# Patient Record
Sex: FEMALE
[De-identification: names, ages, dates, MRNs, and addresses within clinical notes are randomized per-mention and may not be internally consistent; named-entity substitution may affect disease eponyms.]

---

## 2018-03-14 ENCOUNTER — HOSPITAL ENCOUNTER (INPATIENT)
Dept: HOSPITAL 5 - LD | Age: 33
LOS: 3 days | Discharge: HOME | End: 2018-03-17
Attending: OBSTETRICS & GYNECOLOGY | Admitting: OBSTETRICS & GYNECOLOGY
Payer: MEDICAID

## 2018-03-14 DIAGNOSIS — O28.5: ICD-10-CM

## 2018-03-14 DIAGNOSIS — Z3A.39: ICD-10-CM

## 2018-03-14 DIAGNOSIS — F17.210: ICD-10-CM

## 2018-03-14 LAB
HCT VFR BLD CALC: 36.5 % (ref 30.3–42.9)
HGB BLD-MCNC: 12.5 GM/DL (ref 10.1–14.3)
MCH RBC QN AUTO: 31 PG (ref 28–32)
MCHC RBC AUTO-ENTMCNC: 34 % (ref 30–34)
MCV RBC AUTO: 91 FL (ref 79–97)
PLATELET # BLD: 235 K/MM3 (ref 140–440)
RBC # BLD AUTO: 4.02 M/MM3 (ref 3.65–5.03)

## 2018-03-14 PROCEDURE — 83615 LACTATE (LD) (LDH) ENZYME: CPT

## 2018-03-14 PROCEDURE — 86592 SYPHILIS TEST NON-TREP QUAL: CPT

## 2018-03-14 PROCEDURE — 86901 BLOOD TYPING SEROLOGIC RH(D): CPT

## 2018-03-14 PROCEDURE — 86850 RBC ANTIBODY SCREEN: CPT

## 2018-03-14 PROCEDURE — 82962 GLUCOSE BLOOD TEST: CPT

## 2018-03-14 PROCEDURE — 36415 COLL VENOUS BLD VENIPUNCTURE: CPT

## 2018-03-14 PROCEDURE — 84460 ALANINE AMINO (ALT) (SGPT): CPT

## 2018-03-14 PROCEDURE — 82565 ASSAY OF CREATININE: CPT

## 2018-03-14 PROCEDURE — 76816 OB US FOLLOW-UP PER FETUS: CPT

## 2018-03-14 PROCEDURE — 71046 X-RAY EXAM CHEST 2 VIEWS: CPT

## 2018-03-14 PROCEDURE — 86900 BLOOD TYPING SEROLOGIC ABO: CPT

## 2018-03-14 PROCEDURE — 99211 OFF/OP EST MAY X REQ PHY/QHP: CPT

## 2018-03-14 PROCEDURE — 81001 URINALYSIS AUTO W/SCOPE: CPT

## 2018-03-14 PROCEDURE — 85027 COMPLETE CBC AUTOMATED: CPT

## 2018-03-14 PROCEDURE — G0463 HOSPITAL OUTPT CLINIC VISIT: HCPCS

## 2018-03-14 PROCEDURE — 84450 TRANSFERASE (AST) (SGOT): CPT

## 2018-03-14 PROCEDURE — 84550 ASSAY OF BLOOD/URIC ACID: CPT
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
## 2018-03-15 PROCEDURE — 3E0P7VZ INTRODUCTION OF HORMONE INTO FEMALE REPRODUCTIVE, VIA NATURAL OR ARTIFICIAL OPENING: ICD-10-PCS | Performed by: OBSTETRICS & GYNECOLOGY

## 2018-03-15 RX ADMIN — SODIUM CHLORIDE, SODIUM ACETATE ANHYDROUS, SODIUM GLUCONATE, POTASSIUM CHLORIDE, AND MAGNESIUM CHLORIDE SCH MLS/HR: 526; 222; 502; 37; 30 INJECTION, SOLUTION INTRAVENOUS at 16:10

## 2018-03-15 RX ADMIN — SODIUM CHLORIDE, SODIUM ACETATE ANHYDROUS, SODIUM GLUCONATE, POTASSIUM CHLORIDE, AND MAGNESIUM CHLORIDE SCH MLS/HR: 526; 222; 502; 37; 30 INJECTION, SOLUTION INTRAVENOUS at 17:55

## 2018-03-15 NOTE — OPERATIVE REPORT
Operative Report


Operative Report: 


Date of procedure: 03/15/2018





Pre-operative diagnosis: Breech Presentation





Post-operative diagnosis: Same





Procedure name(s): Primary low transverse  section





Surgeon: Rudy Mcleod MD





Assistant: Anne Davila certified nurse midwife





Anesthesia: Spinal





EBL: 800





Complications: None





Findings: Normal uterus tubes and ovaries, male infant breech presentation, 

weight 7 lbs. 1 oz., Apgars 8 at 1 minute 9 at 5 minutes





Specimen(s): None





Procedure: The patient was brought to the operating room.  A spinal was placed 

without any complications.  She was then placed in left lateral tilt.  Prepped 

and draped in the usual sterile manner.  After testing for adequate anesthesia 

level, a Pfannenstiel incision was made.  This incision was taken down to the 

fascia.  The fascia was then nicked in the midline.  This incision was extended 

out laterally with Ibanez scissors.  The fascia was then  sharply and 

bluntly from the underlying rectus muscles.  The rectus muscles were bluntly 

and sharply .  The peritoneum was then entered with the 's 

fingers.  This incision was spread vertically with care not to damage the 

bladder below.  The bladder flap was then formed sharply and bluntly with 

Metzenbaum scissors.  A transverse incision was made in lower uterine segment.  

This incision was extended laterally with the operators fingers.  The amniotic 

sac was then entered bluntly with the 's fingers.  The infant was 

delivered by delivered in the breech first flexing and extending the lower 

extremities followed by raising the breech then sweeping flexing and extending 

the upper extremities and after coming head was then delivered safely.  The 

infant was bulb suctioned on the mother's abdomen.  Cord was double clamped and 

cut.  The infant was then passed to the nursery personnel who were in 

attendance.  The above Apgar scores were given by the nursery personnel.  The 

placenta was then bluntly removed.  The uterus was then externalized and wiped 

clean the remaining products.  The uterine incision was closed in layers.  The 

first incision was closed in a locking manner using 0 Vicryl.  This was 

followed by imbricating stitch also with 0 Vicryl.  This closure was 

hemostatic.  The bladder flap was copiously irrigated and found to be 

hemostatic. The pelvis was copiously irrigated and found to be hemostatic.  The 

uterus was then placed back to the patient's abdomen.  Surgicel was placed 

across the uterine closure The retractors were removed.  The rectus muscles 

were inspected and found to be hemostatic.  The fascia was then closed in a 

running manner using 0 Vicryl.  This incision was hemostatic irrigation Bovie.  

The skin was reapproximated with 4-0 Vicryl subcuticularly.  The patient 

tolerated procedure well.  Her urine was clear.  The infant was admitted to the 

well baby nursery.  The patient was accompanied to recovery room in good 

condition.  Instrument count correct 3.

## 2018-03-15 NOTE — ULTRASOUND REPORT
FINAL REPORT



EXAM:  US OB FOLLOW UP



HISTORY:  EFW/POSITION 



TECHNIQUE:   Ultrasound evaluation of the gravid uterus 



PRIORS:  None.



FINDINGS:  

There is a single viable intrauterine pregnancy with documented

cardiac activity.  Multiple ultrasound measurements are made to

determine a composite gestational age. 



Nonspecific slightly high cephalic index at 84.1, upper limits

83.0. Other fetal ratios are within normal limits. No sonographic

abnormality in the limited visualized portion of the fetal

anatomy.



Heart rate:  121 beats per minute



Fetal position:  Breech with fetal head in right upper quadrant



Estimated fetal weight: 3412 g



Growth percentile by ultrasound:  37



Ultrasound estimated gestational age: 39 weeks 0 days



Ultrasound estimated delivery date: 3/22/2018



LMP estimated gestational age: 39 weeks 5 days



LMP estimated delivery date: 3/17/2018



IMPRESSION:  

Single viable intrauterine pregnancy with the above parameters



Breech presentation 



Nonspecific slightly elevated cephalic index

## 2018-03-15 NOTE — PROGRESS NOTE
Assessment and Plan


US done baby is breech. Pt was allowed to eat despite orders for breakfast 

only. Will wait the required 6 hours then move forward with c/s @ that time. 

 aware.  anesthesia called he has OKed this time. L&D 

charge nurse made aware of plans. Spoke with pt over the phone she agrees to 

POC. C/S preop orders in EMR








Subjective





- Subjective


Date of service: 03/15/18 (rec a call from RN that she had concerns @ 

presentation)


Principal diagnosis: IUP @ 39w5d GDM; obesity IOL


Patient reports: fetal movement normal





Objective





- Vital Signs


Vital Signs: 


 Vital Signs - 12hr











  03/15/18 03/15/18 03/15/18





  04:35 04:44 08:46


 


Temperature 97.3 F L  97.5 F L


 


Pulse Rate  85 83


 


Respiratory 22  18





Rate   


 


Blood Pressure  136/67 


 


Blood Pressure   157/72





[Left]   














  03/15/18 03/15/18 03/15/18





  08:50 08:51 10:50


 


Temperature   


 


Pulse Rate 83 80 69


 


Respiratory   





Rate   


 


Blood Pressure 156/71 157/72 143/75


 


Blood Pressure   





[Left]   














- Exam


Cardiovascular: Regular rate


Lungs: Normal air movement


Abdomen: Present: normal appearance, soft.  Absent: distention, tenderness


Uterus: Present: normal


FHR: auscultation normal, category 1


Uterine Contraction Pattern: Irregular


Uterine Tone Measurement Phase: Resting


Uterine Contraction Intensity: Mild


Extremities: edema


Deep Tendon Reflex Grade: Normal +2





- Labs


Labs: 


 Abnormal Labs











  03/14/18 03/14/18





  21:30 22:18


 


WBC  14.4 H 


 


POC Glucose   126 H








 Laboratory Results - last 24 hr











  03/14/18 03/14/18 03/14/18





  21:30 21:30 21:30


 


WBC  14.4 H  


 


RBC  4.02  


 


Hgb  12.5  


 


Hct  36.5  


 


MCV  91  


 


MCH  31  


 


MCHC  34  


 


RDW  13.8  


 


Plt Count  235  


 


POC Glucose   


 


RPR   Nonreactive 


 


Blood Type    A POSITIVE


 


Antibody Screen    Negative














  03/14/18 03/15/18





  22:18 06:01


 


WBC  


 


RBC  


 


Hgb  


 


Hct  


 


MCV  


 


MCH  


 


MCHC  


 


RDW  


 


Plt Count  


 


POC Glucose  126 H  70


 


RPR  


 


Blood Type  


 


Antibody Screen

## 2018-03-15 NOTE — PROGRESS NOTE
Assessment and Plan


Pt c/o cramping and just being uncomfortable in the bed. Tylenol po offered. 

SVE 1,thick, OOP Cervidil in place. Pt OOB to void. Will allow AM care, diet, 

and start pitocin per protocol once Cervidil is removed. Pt instructed to not 

consume any more outside food, diet will be served from hospital. Hydration 

encouraged. POC reviewed with pt, all questions addressed. FBS 70.








Subjective





- Subjective


Date of service: 03/15/18 (pt resting c/o being uncomfortable in bed)


Principal diagnosis: IUP @ 39w5d GDM; obesity IOL


Patient reports: fetal movement normal





Objective





- Vital Signs


Vital Signs: 


 Vital Signs - 12hr











  03/14/18 03/14/18 03/14/18





  20:54 20:58 21:12


 


Temperature 97.1 F L  


 


Pulse Rate 100 H 100 H 87


 


Respiratory 18  





Rate   


 


Blood Pressure  134/76 


 


Blood Pressure 134/76  





[Left]   


 


O2 Sat by Pulse   94





Oximetry   














  03/14/18 03/14/18 03/14/18





  21:13 21:17 21:18


 


Temperature   


 


Pulse Rate 86 84 91 H


 


Respiratory   





Rate   


 


Blood Pressure   


 


Blood Pressure   





[Left]   


 


O2 Sat by Pulse 94 94 94





Oximetry   














  03/15/18 03/15/18





  04:35 04:44


 


Temperature 97.3 F L 


 


Pulse Rate  85


 


Respiratory 22 





Rate  


 


Blood Pressure  136/67


 


Blood Pressure  





[Left]  


 


O2 Sat by Pulse  





Oximetry  














- Exam


Breasts: deferred


Cardiovascular: Regular rate


Lungs: Normal air movement


Abdomen: Present: normal appearance, soft.  Absent: distention, tenderness


Uterus: Present: normal


FHR: auscultation normal, category 1 (pt OOB to toilet RN reports Cat 1 strip; 

instructed pt to call out when she returns from toilet so the monitor can be 

reapplied)


Uterine Contraction Monitor Mode: External


Cervical Dilatation: 1 (Cervidil in place)


Cervical Effacement Percentage: 50


Fetal station: -3


Uterine Contraction Pattern: Irregular


Uterine Tone Measurement Phase: Resting


Uterine Contraction Intensity: Mild





- Labs


Labs: 


 Abnormal Labs











  03/14/18





  21:30


 


WBC  14.4 H








 Laboratory Results - last 24 hr











  03/14/18 03/14/18





  21:30 21:30


 


WBC  14.4 H 


 


RBC  4.02 


 


Hgb  12.5 


 


Hct  36.5 


 


MCV  91 


 


MCH  31 


 


MCHC  34 


 


RDW  13.8 


 


Plt Count  235 


 


Blood Type   A POSITIVE


 


Antibody Screen   Negative

## 2018-03-15 NOTE — ANESTHESIA CONSULTATION
Anesthesia Consult and Med Hx


Date of service: 03/15/18





- Airway


Anesthetic Teeth Evaluation: Good


ROM Head & Neck: Adequate


Mental/Hyoid Distance: Adequate


Mallampati Class: Class I


Intubation Access Assessment: Good





- Pulmonary Exam


CTA: Yes





- Cardiac Exam


Cardiac Exam: RRR





- Pre-Operative Health Status


ASA Pre-Surgery Classification: ASA2


Proposed Anesthetic Plan: Epidural, Spinal





- Pulmonary


Hx Asthma: No





- Cardiovascular System


Hx Hypertension: No





- Central Nervous System


Hx Seizures: No


Hx Psychiatric Problems: Yes (ANXIETY PREVIOUSLY ON LITHIUM)





- Endocrine


Hx Renal Disease: No


Hx Non-Insulin Dependent Diabetes: Yes (gestational. Diet controlled)


Hx Hypothyroidism: No


Hx Hyperthyroidism: No





- Hematic


Hx Anemia: No


Hx Sickle Cell Disease: No





- Other Systems


Hx Alcohol Use: No


Hx Obesity: Yes

## 2018-03-16 LAB
ALT SERPL-CCNC: 12 UNITS/L (ref 7–56)
BILIRUB UR QL STRIP: (no result)
BLOOD UR QL VISUAL: (no result)
HCT VFR BLD CALC: 35.6 % (ref 30.3–42.9)
HGB BLD-MCNC: 12.2 GM/DL (ref 10.1–14.3)
MCH RBC QN AUTO: 31 PG (ref 28–32)
MCHC RBC AUTO-ENTMCNC: 34 % (ref 30–34)
MCV RBC AUTO: 91 FL (ref 79–97)
MUCOUS THREADS #/AREA URNS HPF: (no result) /HPF
PH UR STRIP: 6 [PH] (ref 5–7)
PLATELET # BLD: 185 K/MM3 (ref 140–440)
PROT UR STRIP-MCNC: (no result) MG/DL
RBC # BLD AUTO: 3.91 M/MM3 (ref 3.65–5.03)
RBC #/AREA URNS HPF: 2 /HPF (ref 0–6)
URATE SERPL-MCNC: 4.8 MG/DL (ref 3.5–7.6)
UROBILINOGEN UR-MCNC: < 2 MG/DL (ref ?–2)
WBC #/AREA URNS HPF: 2 /HPF (ref 0–6)

## 2018-03-16 RX ADMIN — SODIUM CHLORIDE SCH MLS/HR: 0.45 INJECTION, SOLUTION INTRAVENOUS at 01:31

## 2018-03-16 RX ADMIN — KETOROLAC TROMETHAMINE SCH MG: 30 INJECTION, SOLUTION INTRAMUSCULAR at 07:45

## 2018-03-16 RX ADMIN — SODIUM CHLORIDE SCH MLS/HR: 0.45 INJECTION, SOLUTION INTRAVENOUS at 11:09

## 2018-03-16 RX ADMIN — LABETALOL HYDROCHLORIDE SCH MG: 200 TABLET, FILM COATED ORAL at 22:12

## 2018-03-16 RX ADMIN — CEFAZOLIN SCH MLS/MIN: 10 INJECTION, POWDER, FOR SOLUTION INTRAVENOUS at 09:00

## 2018-03-16 RX ADMIN — IBUPROFEN PRN MG: 800 TABLET, FILM COATED ORAL at 20:17

## 2018-03-16 RX ADMIN — KETOROLAC TROMETHAMINE SCH MG: 30 INJECTION, SOLUTION INTRAMUSCULAR at 14:05

## 2018-03-16 RX ADMIN — HYDROCODONE BITARTRATE AND ACETAMINOPHEN PRN EACH: 5; 325 TABLET ORAL at 20:16

## 2018-03-16 RX ADMIN — HYDROCODONE BITARTRATE AND ACETAMINOPHEN PRN EACH: 5; 325 TABLET ORAL at 13:15

## 2018-03-16 RX ADMIN — CEFAZOLIN SCH MLS/MIN: 10 INJECTION, POWDER, FOR SOLUTION INTRAVENOUS at 01:33

## 2018-03-16 RX ADMIN — LABETALOL HYDROCHLORIDE SCH MG: 200 TABLET, FILM COATED ORAL at 10:00

## 2018-03-16 RX ADMIN — KETOROLAC TROMETHAMINE SCH MG: 30 INJECTION, SOLUTION INTRAMUSCULAR at 01:37

## 2018-03-16 NOTE — XRAY REPORT
FINAL REPORT



PROCEDURE:  XR CHEST ROUTINE 2V



TECHNIQUE:  PA and lateral chest radiographs were obtained. CPT

57793



HISTORY:  Shortness of breath. 



COMPARISON:  No prior studies are available for comparison.



FINDINGS:  

Heart: Normal.



Mediastinum/Vessels: Normal.



Lungs/Pleural space: Normal.



Bony thorax: No acute osseous abnormality.



Other: Elevation of the right diaphragm. Subtle right

subdiaphragmatic lucency. 



IMPRESSION:  

No radiographic evidence of acute cardiopulmonary disease.



Subtle right subdiaphragmatic lucency, cannot exclude small

amount of free intraperitoneal air.

## 2018-03-16 NOTE — PROGRESS NOTE
Assessment and Plan


 patient doing well, pain well controlled. HAs not yet ambulated but due to get 

up this hour. Lochia scant, incision dressing dry and intact. output adequate. 

Denies HA, visual changes or epigastric pain, pre-e labs normal. b/p 160-170's/

80's. Consulted with Dr. Yancey - will start Labetalol 200 mg BID and continue 

to monitor b/p's. Continue postop pathway.








- Patient Problems


(1)  delivery delivered


Current Visit: Yes   Status: Acute   





(2) HTN (hypertension)


Current Visit: Yes   Status: Acute   


Qualifiers: 


   Hypertension type: unspecified   Qualified Code(s): I10 - Essential (primary

) hypertension   





Subjective





- Subjective


Date of service: 18


Principal diagnosis: postop day 1 (<12hrs) s/p primary c/s


Patient reports: pain well controlled, other (flores draining to bsb), no 

nauseated


: doing well, bottle feeding (breast and bottle feeding - needs 

lactation to assist)





Objective





- Vital Signs


Latest vital signs: 


 Vital Signs











  Temp Pulse Resp BP BP Pulse Ox


 


 18 04:20   67    179/85 


 


 18 03:00   65    167/81 


 


 18 01:39  97.5 F L     175/84 


 


 03/15/18 21:57  97.5 F L  66  20  167/81   99


 


 03/15/18 21:00   52 L  21  149/75   100


 


 03/15/18 20:51   61  22  147/76   98


 


 03/15/18 20:40   60  18  130/67   97


 


 03/15/18 20:30   65  23  107/52   95


 


 03/15/18 20:20   68  14  98/42   96


 


 03/15/18 20:10   65  17  100/42   95


 


 03/15/18 20:09       94


 


 03/15/18 18:02  97.7 F  76  16   144/75 


 


 03/15/18 17:51   76   144/75  


 


 03/15/18 13:00   69    143/75 


 


 03/15/18 12:40   66    145/79 


 


 03/15/18 10:50   69   143/75  


 


 03/15/18 08:51   80   157/72  


 


 03/15/18 08:50   83   156/71  


 


 03/15/18 08:46  97.5 F L  83  18   157/72 








 Intake and Output











 03/15/18 03/16/18 03/16/18





 23:59 07:59 15:59


 


Intake Total 2800 240 


 


Output Total 800 850 


 


Balance 2000 -610 


 


Intake:   


 


  IV 2800  


 


    Normosol-R pH 7.4 1,000 1000  





    ml @ 2250 mls/hr IV PREOP   





    [facility] hospital Rx#:[id number]   


 


  Intake, Free Water  240 


 


Output:   


 


  Urine 800 850 


 


    Indwelling Catheter  850 


 


    Uretheral (Flores) 300  


 


Other:   


 


  Total, Output Amount  150 


 


  Estimated Blood Loss  600 














- Exam


Breasts: Present: normal


Cardiovascular: Present: Regular rate


Lungs: Present: Clear to auscultation, Normal air movement


Abdomen: Present: normal appearance, soft


Vulva: both: normal


Uterus: Present: normal, firm, fundal height at umbilicus


Extremities: Present: normal


Deep Tendon Reflex Grade: Normal +2


Incision: Present: normal, dry, dressed





- Labs


Labs: 


 Abnormal lab results











  18 Range/Units





  05:34 05:34 


 


WBC  14.2 H   (4.5-11.0)  K/mm3


 


Creatinine   0.4 L  (0.7-1.2)  mg/dL


 


Lactate Dehydrogenase   212 H  ()  units/L

## 2018-03-16 NOTE — EVENT NOTE
Date: 03/16/18


X-ray results reviewed. Just air under diaphram which could explain the 

symptoms she c/o earlier. Will con't to monitor at this time. BPs still 

slightly elevated but LFTs were normal.

## 2018-03-17 VITALS — SYSTOLIC BLOOD PRESSURE: 150 MMHG | DIASTOLIC BLOOD PRESSURE: 54 MMHG

## 2018-03-17 RX ADMIN — IBUPROFEN PRN MG: 800 TABLET, FILM COATED ORAL at 02:19

## 2018-03-17 RX ADMIN — HYDROCODONE BITARTRATE AND ACETAMINOPHEN PRN EACH: 5; 325 TABLET ORAL at 02:18

## 2018-03-17 RX ADMIN — LABETALOL HYDROCHLORIDE SCH MG: 200 TABLET, FILM COATED ORAL at 11:15

## 2018-03-17 NOTE — DISCHARGE SUMMARY
Providers





- Providers


Date of Admission: 


18 20:33





Date of discharge: 18 (requests d/c home today)


Attending physician: 


ALEJANDRO BANEGAS





 





03/15/18 22:16


Consult to Lactation Consultant [CONS] Routine 


   Reason For Exam: 











Primary care physician: 


ALEJANDRO BANEGAS








Hospitalization


Reason for admission: induction of labor,GDM - diet controlled


Condition: Good


Procedures: 


 primary c/s for breech





Hospital course: 


 induction of labor, primary c/s for breech, elevated b/p postpartum with 

normal labs, responded well to 200 labetalol PO BID.





Disposition: DC- TO HOME OR SELFCARE





- Discharge Diagnoses


(1)  delivery delivered


Status: Acute   





(2) HTN (hypertension)


Status: Acute   


Qualifiers: 


   Hypertension type: unspecified   Qualified Code(s): I10 - Essential (primary

) hypertension   





Core Measure Documentation





- Palliative Care


Palliative Care/ Comfort Measures: Not Applicable





- Core Measures


Any of the following diagnoses?: none





Exam





- Constitutional


Vitals: 


 











Temp Pulse Resp BP Pulse Ox


 


 98.1 F   83   18   129/52   96 


 


 18 23:55  18 23:55  18 02:19  18 23:55  18 23:55











General appearance: Present: no acute distress, well-nourished





- EENT


Eyes: Present: PERRL


ENT: hearing intact, clear oral mucosa





- Neck


Neck: Present: supple, normal ROM





- Respiratory


Respiratory effort: normal


Respiratory: bilateral: CTA





- Cardiovascular


Heart Sounds: Present: S1 & S2.  Absent: rub, click





- Extremities


Extremities: pulses symmetrical, No edema


Peripheral Pulses: within normal limits





- Abdominal


General gastrointestinal: Present: soft, non-tender, non-distended, normal 

bowel sounds


Female genitourinary: Present: normal





- Integumentary


Integumentary: Present: clear, warm, dry





- Musculoskeletal


Musculoskeletal: gait normal, strength equal bilaterally





- Psychiatric


Psychiatric: appropriate mood/affect, intact judgment & insight





- Neurologic


Neurologic: CNII-XII intact, moves all extremities





- Additional findings


Additional findings: 


 incision D&I, lochia scant, H&H stable, fundus firm, bottle feeding. f/u 

appointment scheduled in 1 week.








Plan


Activity: advance as tolerated


Diet: regular


Wound: open to air, keep clean and dry


Follow up with: 


ALEJANDRO BANEGAS MD [Primary Care Provider] - 18 3:00 pm (Congratulations

! Please call [phone] on Monday to schedule your son's circumcision in 1 

week. Bring EMLA cream to your son's appointment and await further 

instructions. You have an appointment with Britney in [place] office  @ 3pm. Call for any questions or concerns.)


Prescriptions: 


Ferrous Sulfate [Feosol 325 MG tab] 325 mg PO BID #60 tablet


Ibuprofen [Motrin 800 MG tab] 800 mg PO Q6H PRN #30 tablet


 PRN Reason: Pain


Lidocain2.5%/Prilocai2.5% [Emla] 5 gm TP ONCE PRN #1 tube


 PRN Reason: Pain


oxyCODONE /ACETAMINOPHEN [Percocet 5/325 mg] 1 - 2 tab PO Q4H PRN #30 tablet


 PRN Reason: Pain, Moderate